# Patient Record
Sex: FEMALE | Race: WHITE | NOT HISPANIC OR LATINO
[De-identification: names, ages, dates, MRNs, and addresses within clinical notes are randomized per-mention and may not be internally consistent; named-entity substitution may affect disease eponyms.]

---

## 2020-04-30 ENCOUNTER — MESSAGE (OUTPATIENT)
Age: 60
End: 2020-04-30

## 2020-05-07 LAB
SARS-COV-2 IGG SERPL IA-ACNC: 0.7 INDEX
SARS-COV-2 IGG SERPL QL IA: NEGATIVE

## 2020-10-05 ENCOUNTER — TRANSCRIPTION ENCOUNTER (OUTPATIENT)
Age: 60
End: 2020-10-05

## 2020-10-28 ENCOUNTER — TRANSCRIPTION ENCOUNTER (OUTPATIENT)
Age: 60
End: 2020-10-28

## 2021-12-13 ENCOUNTER — TRANSCRIPTION ENCOUNTER (OUTPATIENT)
Age: 61
End: 2021-12-13

## 2021-12-15 ENCOUNTER — TRANSCRIPTION ENCOUNTER (OUTPATIENT)
Age: 61
End: 2021-12-15

## 2022-07-12 ENCOUNTER — RESULT REVIEW (OUTPATIENT)
Age: 62
End: 2022-07-12

## 2023-08-11 ENCOUNTER — NON-APPOINTMENT (OUTPATIENT)
Age: 63
End: 2023-08-11

## 2023-08-11 ENCOUNTER — APPOINTMENT (OUTPATIENT)
Dept: AFTER HOURS CARE | Facility: EMERGENCY ROOM | Age: 63
End: 2023-08-11
Payer: COMMERCIAL

## 2023-08-11 DIAGNOSIS — R21 RASH AND OTHER NONSPECIFIC SKIN ERUPTION: ICD-10-CM

## 2023-08-11 PROBLEM — Z00.00 ENCOUNTER FOR PREVENTIVE HEALTH EXAMINATION: Status: ACTIVE | Noted: 2023-08-11

## 2023-08-11 PROCEDURE — 99204 OFFICE O/P NEW MOD 45 MIN: CPT | Mod: 95

## 2023-08-11 NOTE — HISTORY OF PRESENT ILLNESS
[Home] : at home, [unfilled] , at the time of the visit. [Other Location: e.g. Home (Enter Location, City,State)___] : at [unfilled] [Verbal consent obtained from patient] : the patient, [unfilled] [FreeTextEntry8] : 62F presents 1 day of rash to both lower legs. No pain. No fever. No itching. Pt spent day in [place]. No exposure to plants or chemicals. No h/o similar rashes. Pt took one dose Amoxicillin for toothache this AM. PMH: SVT, CAD,  bladder spasm, hypothyroidism Meds: Verapamil, Atorvastatin, Levothyroxine, VitD, ASA 81mg, solifenacin  (EROD [facility])

## 2023-08-11 NOTE — ASSESSMENT
[FreeTextEntry1] : DDx: vasculitis vs drug eruption. Less likely allergic or infectious. Hold Solifenacin. Initial labs for vasculitis w/u (CBC, CMP, UA, INR/PT). f/u dermatology.

## 2023-08-11 NOTE — PHYSICAL EXAM
[No Acute Distress] : no acute distress [Normal Sclera/Conjunctiva] : normal sclera/conjunctiva [Normal Outer Ear/Nose] : the outer ears and nose were normal in appearance [No Respiratory Distress] : no respiratory distress  [Speech Grossly Normal] : speech grossly normal [de-identified] : diffuse macular red rash to b/l calves

## 2023-08-11 NOTE — PLAN
[No new medications perscribed] : Treat in place: No new medications prescribed [FreeTextEntry1] : You were evaluated for a rash on your legs. Laboratory testing for blood and urine test was sent to [facility] (CBC, CMP, INR, UA). Hold solifenacin until cleared to resume by a doctor as this medication can cause rashes. Seek medical attention if rash is spreading, you have trouble breathing, fevers, or any concerns.

## 2025-09-10 ENCOUNTER — RESULT REVIEW (OUTPATIENT)
Age: 65
End: 2025-09-10